# Patient Record
Sex: FEMALE | Race: WHITE | NOT HISPANIC OR LATINO | URBAN - METROPOLITAN AREA
[De-identification: names, ages, dates, MRNs, and addresses within clinical notes are randomized per-mention and may not be internally consistent; named-entity substitution may affect disease eponyms.]

---

## 2020-09-30 ENCOUNTER — OFFICE (OUTPATIENT)
Dept: URBAN - METROPOLITAN AREA CLINIC 9 | Facility: CLINIC | Age: 30
End: 2020-09-30

## 2020-09-30 VITALS
HEIGHT: 66 IN | HEART RATE: 81 BPM | WEIGHT: 263 LBS | DIASTOLIC BLOOD PRESSURE: 70 MMHG | SYSTOLIC BLOOD PRESSURE: 122 MMHG | OXYGEN SATURATION: 99 %

## 2020-09-30 DIAGNOSIS — R12 HEARTBURN: ICD-10-CM

## 2020-09-30 DIAGNOSIS — R13.19 OTHER DYSPHAGIA: ICD-10-CM

## 2020-09-30 PROCEDURE — 99204 OFFICE O/P NEW MOD 45 MIN: CPT | Performed by: SPECIALIST

## 2020-10-23 ENCOUNTER — AMBULATORY SURGICAL CENTER (OUTPATIENT)
Dept: URBAN - METROPOLITAN AREA SURGERY 2 | Facility: SURGERY | Age: 30
End: 2020-10-23
Payer: COMMERCIAL

## 2020-10-23 ENCOUNTER — AMBULATORY SURGICAL CENTER (OUTPATIENT)
Dept: URBAN - METROPOLITAN AREA SURGERY 2 | Facility: SURGERY | Age: 30
End: 2020-10-23

## 2020-10-23 ENCOUNTER — OFFICE (OUTPATIENT)
Dept: URBAN - METROPOLITAN AREA PATHOLOGY 22 | Facility: PATHOLOGY | Age: 30
End: 2020-10-23
Payer: COMMERCIAL

## 2020-10-23 VITALS
HEART RATE: 77 BPM | DIASTOLIC BLOOD PRESSURE: 47 MMHG | OXYGEN SATURATION: 100 % | DIASTOLIC BLOOD PRESSURE: 78 MMHG | RESPIRATION RATE: 12 BRPM | WEIGHT: 260 LBS | WEIGHT: 260 LBS | HEIGHT: 66 IN | WEIGHT: 260 LBS | SYSTOLIC BLOOD PRESSURE: 109 MMHG | TEMPERATURE: 97.2 F | TEMPERATURE: 98.2 F | TEMPERATURE: 98.2 F | OXYGEN SATURATION: 96 % | SYSTOLIC BLOOD PRESSURE: 136 MMHG | HEART RATE: 73 BPM | HEART RATE: 73 BPM | HEART RATE: 73 BPM | DIASTOLIC BLOOD PRESSURE: 85 MMHG | TEMPERATURE: 98.2 F | SYSTOLIC BLOOD PRESSURE: 136 MMHG | SYSTOLIC BLOOD PRESSURE: 136 MMHG | RESPIRATION RATE: 16 BRPM | DIASTOLIC BLOOD PRESSURE: 47 MMHG | HEIGHT: 66 IN | OXYGEN SATURATION: 98 % | HEART RATE: 60 BPM | OXYGEN SATURATION: 100 % | SYSTOLIC BLOOD PRESSURE: 156 MMHG | HEART RATE: 75 BPM | DIASTOLIC BLOOD PRESSURE: 47 MMHG | RESPIRATION RATE: 16 BRPM | SYSTOLIC BLOOD PRESSURE: 107 MMHG | DIASTOLIC BLOOD PRESSURE: 78 MMHG | SYSTOLIC BLOOD PRESSURE: 156 MMHG | RESPIRATION RATE: 12 BRPM | SYSTOLIC BLOOD PRESSURE: 109 MMHG | DIASTOLIC BLOOD PRESSURE: 59 MMHG | HEART RATE: 60 BPM | RESPIRATION RATE: 16 BRPM | DIASTOLIC BLOOD PRESSURE: 54 MMHG | HEIGHT: 66 IN | SYSTOLIC BLOOD PRESSURE: 107 MMHG | DIASTOLIC BLOOD PRESSURE: 85 MMHG | SYSTOLIC BLOOD PRESSURE: 146 MMHG | DIASTOLIC BLOOD PRESSURE: 85 MMHG | SYSTOLIC BLOOD PRESSURE: 156 MMHG | SYSTOLIC BLOOD PRESSURE: 146 MMHG | OXYGEN SATURATION: 98 % | HEART RATE: 77 BPM | SYSTOLIC BLOOD PRESSURE: 146 MMHG | TEMPERATURE: 97.2 F | OXYGEN SATURATION: 100 % | OXYGEN SATURATION: 96 % | HEART RATE: 75 BPM | OXYGEN SATURATION: 96 % | SYSTOLIC BLOOD PRESSURE: 107 MMHG | DIASTOLIC BLOOD PRESSURE: 54 MMHG | DIASTOLIC BLOOD PRESSURE: 78 MMHG | DIASTOLIC BLOOD PRESSURE: 54 MMHG | HEART RATE: 77 BPM | OXYGEN SATURATION: 98 % | HEART RATE: 60 BPM | HEART RATE: 75 BPM | SYSTOLIC BLOOD PRESSURE: 109 MMHG | RESPIRATION RATE: 12 BRPM | DIASTOLIC BLOOD PRESSURE: 59 MMHG | TEMPERATURE: 97.2 F | DIASTOLIC BLOOD PRESSURE: 59 MMHG

## 2020-10-23 DIAGNOSIS — K31.7 POLYP OF STOMACH AND DUODENUM: ICD-10-CM

## 2020-10-23 DIAGNOSIS — K29.50 UNSPECIFIED CHRONIC GASTRITIS WITHOUT BLEEDING: ICD-10-CM

## 2020-10-23 DIAGNOSIS — R13.10 DYSPHAGIA, UNSPECIFIED: ICD-10-CM

## 2020-10-23 PROCEDURE — 43239 EGD BIOPSY SINGLE/MULTIPLE: CPT | Performed by: SPECIALIST

## 2020-10-23 PROCEDURE — 88342 IMHCHEM/IMCYTCHM 1ST ANTB: CPT | Performed by: SPECIALIST

## 2020-10-23 PROCEDURE — 43450 DILATE ESOPHAGUS 1/MULT PASS: CPT | Mod: 51 | Performed by: SPECIALIST

## 2020-10-23 PROCEDURE — 88313 SPECIAL STAINS GROUP 2: CPT | Performed by: SPECIALIST

## 2020-10-23 PROCEDURE — 88305 TISSUE EXAM BY PATHOLOGIST: CPT | Performed by: SPECIALIST

## 2020-12-12 ENCOUNTER — INPATIENT HOSPITAL (OUTPATIENT)
Dept: URBAN - METROPOLITAN AREA HOSPITAL 130 | Facility: HOSPITAL | Age: 30
End: 2020-12-12
Payer: COMMERCIAL

## 2020-12-12 DIAGNOSIS — R13.10 DYSPHAGIA, UNSPECIFIED: ICD-10-CM

## 2020-12-12 DIAGNOSIS — K80.19 CALCULUS OF GALLBLADDER WITH OTHER CHOLECYSTITIS WITH OBSTRU: ICD-10-CM

## 2020-12-12 DIAGNOSIS — R10.84 GENERALIZED ABDOMINAL PAIN: ICD-10-CM

## 2020-12-12 DIAGNOSIS — R94.5 ABNORMAL RESULTS OF LIVER FUNCTION STUDIES: ICD-10-CM

## 2020-12-12 PROCEDURE — 99222 1ST HOSP IP/OBS MODERATE 55: CPT | Performed by: INTERNAL MEDICINE

## 2020-12-13 ENCOUNTER — INPATIENT HOSPITAL (OUTPATIENT)
Dept: URBAN - METROPOLITAN AREA HOSPITAL 130 | Facility: HOSPITAL | Age: 30
End: 2020-12-13
Payer: COMMERCIAL

## 2020-12-13 DIAGNOSIS — R13.10 DYSPHAGIA, UNSPECIFIED: ICD-10-CM

## 2020-12-13 DIAGNOSIS — R10.84 GENERALIZED ABDOMINAL PAIN: ICD-10-CM

## 2020-12-13 DIAGNOSIS — R74.01 ELEVATION OF LEVELS OF LIVER TRANSAMINASE LEVELS: ICD-10-CM

## 2020-12-13 DIAGNOSIS — K80.19 CALCULUS OF GALLBLADDER WITH OTHER CHOLECYSTITIS WITH OBSTRU: ICD-10-CM

## 2020-12-13 PROCEDURE — 99231 SBSQ HOSP IP/OBS SF/LOW 25: CPT | Performed by: INTERNAL MEDICINE

## 2021-03-19 ENCOUNTER — OFFICE (OUTPATIENT)
Dept: URBAN - METROPOLITAN AREA CLINIC 9 | Facility: CLINIC | Age: 31
End: 2021-03-19
Payer: COMMERCIAL

## 2021-03-19 VITALS
DIASTOLIC BLOOD PRESSURE: 71 MMHG | HEIGHT: 66 IN | WEIGHT: 263 LBS | OXYGEN SATURATION: 98 % | SYSTOLIC BLOOD PRESSURE: 117 MMHG | TEMPERATURE: 97.2 F | HEART RATE: 101 BPM

## 2021-03-19 DIAGNOSIS — K52.89 OTHER SPECIFIED NONINFECTIVE GASTROENTERITIS AND COLITIS: ICD-10-CM

## 2021-03-19 LAB
IMMUNOGLOBULIN A, QN, SERUM: 143 MG/DL (ref 87–352)
T-TRANSGLUTAMINASE (TTG) IGA: <2 U/ML

## 2021-03-19 PROCEDURE — 99214 OFFICE O/P EST MOD 30 MIN: CPT | Performed by: SPECIALIST

## 2021-03-19 NOTE — SERVICENOTES
Could offer a trial of colestipol to treat potential post-cholecystectomy diarrhea if she wishes.  She will consider.

## 2021-03-19 NOTE — SERVICEHPINOTES
She's had chronically loose stools for year.  Maybe worse since cholecystectomy.  4 to 6 loose urgent stools daily.  Sometimes dark.  Worse with meals.  Had cholecystectomy in December for cholelithiasis.

## 2021-03-31 ENCOUNTER — AMBULATORY SURGICAL CENTER (OUTPATIENT)
Dept: URBAN - METROPOLITAN AREA SURGERY 2 | Facility: SURGERY | Age: 31
End: 2021-03-31
Payer: COMMERCIAL

## 2021-03-31 ENCOUNTER — OFFICE (OUTPATIENT)
Dept: URBAN - METROPOLITAN AREA PATHOLOGY 22 | Facility: PATHOLOGY | Age: 31
End: 2021-03-31
Payer: COMMERCIAL

## 2021-03-31 VITALS
SYSTOLIC BLOOD PRESSURE: 126 MMHG | TEMPERATURE: 97.9 F | WEIGHT: 255 LBS | DIASTOLIC BLOOD PRESSURE: 69 MMHG | SYSTOLIC BLOOD PRESSURE: 129 MMHG | OXYGEN SATURATION: 98 % | DIASTOLIC BLOOD PRESSURE: 61 MMHG | SYSTOLIC BLOOD PRESSURE: 115 MMHG | OXYGEN SATURATION: 99 % | SYSTOLIC BLOOD PRESSURE: 125 MMHG | HEART RATE: 68 BPM | TEMPERATURE: 98.3 F | DIASTOLIC BLOOD PRESSURE: 64 MMHG | OXYGEN SATURATION: 98 % | HEART RATE: 79 BPM | WEIGHT: 255 LBS | RESPIRATION RATE: 16 BRPM | DIASTOLIC BLOOD PRESSURE: 62 MMHG | HEART RATE: 68 BPM | DIASTOLIC BLOOD PRESSURE: 88 MMHG | HEART RATE: 72 BPM | DIASTOLIC BLOOD PRESSURE: 69 MMHG | DIASTOLIC BLOOD PRESSURE: 69 MMHG | SYSTOLIC BLOOD PRESSURE: 119 MMHG | HEART RATE: 70 BPM | HEIGHT: 66 IN | SYSTOLIC BLOOD PRESSURE: 129 MMHG | HEART RATE: 72 BPM | SYSTOLIC BLOOD PRESSURE: 126 MMHG | DIASTOLIC BLOOD PRESSURE: 88 MMHG | SYSTOLIC BLOOD PRESSURE: 125 MMHG | HEART RATE: 70 BPM | HEIGHT: 66 IN | HEART RATE: 68 BPM | HEART RATE: 79 BPM | SYSTOLIC BLOOD PRESSURE: 119 MMHG | SYSTOLIC BLOOD PRESSURE: 129 MMHG | RESPIRATION RATE: 16 BRPM | TEMPERATURE: 97.9 F | HEART RATE: 74 BPM | SYSTOLIC BLOOD PRESSURE: 126 MMHG | HEIGHT: 66 IN | OXYGEN SATURATION: 99 % | SYSTOLIC BLOOD PRESSURE: 115 MMHG | TEMPERATURE: 97.9 F | SYSTOLIC BLOOD PRESSURE: 119 MMHG | HEART RATE: 72 BPM | HEART RATE: 74 BPM | TEMPERATURE: 98.3 F | SYSTOLIC BLOOD PRESSURE: 115 MMHG | DIASTOLIC BLOOD PRESSURE: 62 MMHG | OXYGEN SATURATION: 98 % | OXYGEN SATURATION: 100 % | DIASTOLIC BLOOD PRESSURE: 64 MMHG | HEART RATE: 74 BPM | OXYGEN SATURATION: 100 % | RESPIRATION RATE: 16 BRPM | WEIGHT: 255 LBS | SYSTOLIC BLOOD PRESSURE: 125 MMHG | HEART RATE: 79 BPM | DIASTOLIC BLOOD PRESSURE: 64 MMHG | DIASTOLIC BLOOD PRESSURE: 61 MMHG | TEMPERATURE: 98.3 F | OXYGEN SATURATION: 99 % | DIASTOLIC BLOOD PRESSURE: 62 MMHG | DIASTOLIC BLOOD PRESSURE: 88 MMHG | OXYGEN SATURATION: 100 % | HEART RATE: 70 BPM | DIASTOLIC BLOOD PRESSURE: 61 MMHG

## 2021-03-31 DIAGNOSIS — R19.7 DIARRHEA, UNSPECIFIED: ICD-10-CM

## 2021-03-31 PROCEDURE — 88305 TISSUE EXAM BY PATHOLOGIST: CPT | Performed by: SPECIALIST

## 2021-03-31 PROCEDURE — 88342 IMHCHEM/IMCYTCHM 1ST ANTB: CPT | Performed by: SPECIALIST

## 2021-03-31 PROCEDURE — 45380 COLONOSCOPY AND BIOPSY: CPT | Performed by: SPECIALIST

## 2021-03-31 RX ORDER — COLESTIPOL HYDROCHLORIDE 1 G/1
2 TABLET, FILM COATED ORAL
Qty: 60 | Refills: 11 | Status: ACTIVE
Start: 2021-03-31